# Patient Record
Sex: MALE | Race: WHITE | NOT HISPANIC OR LATINO | URBAN - METROPOLITAN AREA
[De-identification: names, ages, dates, MRNs, and addresses within clinical notes are randomized per-mention and may not be internally consistent; named-entity substitution may affect disease eponyms.]

---

## 2020-03-07 ENCOUNTER — INPATIENT (INPATIENT)
Facility: HOSPITAL | Age: 35
LOS: 1 days | Discharge: HOME | End: 2020-03-09
Attending: INTERNAL MEDICINE | Admitting: INTERNAL MEDICINE
Payer: OTHER GOVERNMENT

## 2020-03-07 VITALS
HEART RATE: 141 BPM | RESPIRATION RATE: 20 BRPM | SYSTOLIC BLOOD PRESSURE: 155 MMHG | DIASTOLIC BLOOD PRESSURE: 75 MMHG | TEMPERATURE: 98 F | OXYGEN SATURATION: 98 %

## 2020-03-07 LAB
ALBUMIN SERPL ELPH-MCNC: 5.2 G/DL — SIGNIFICANT CHANGE UP (ref 3.5–5.2)
ALP SERPL-CCNC: 60 U/L — SIGNIFICANT CHANGE UP (ref 30–115)
ALT FLD-CCNC: 164 U/L — HIGH (ref 0–41)
AMPHET UR-MCNC: NEGATIVE — SIGNIFICANT CHANGE UP
ANION GAP SERPL CALC-SCNC: 36 MMOL/L — HIGH (ref 7–14)
APAP SERPL-MCNC: <5 UG/ML — LOW (ref 10–30)
APPEARANCE UR: CLEAR — SIGNIFICANT CHANGE UP
AST SERPL-CCNC: 157 U/L — HIGH (ref 0–41)
B-OH-BUTYR SERPL-SCNC: 1 MMOL/L — HIGH
BACTERIA # UR AUTO: NEGATIVE — SIGNIFICANT CHANGE UP
BARBITURATES UR SCN-MCNC: POSITIVE
BASE EXCESS BLDV CALC-SCNC: -3.9 MMOL/L — LOW (ref -2–2)
BASOPHILS # BLD AUTO: 0.04 K/UL — SIGNIFICANT CHANGE UP (ref 0–0.2)
BASOPHILS NFR BLD AUTO: 0.4 % — SIGNIFICANT CHANGE UP (ref 0–1)
BENZODIAZ UR-MCNC: NEGATIVE — SIGNIFICANT CHANGE UP
BILIRUB SERPL-MCNC: 0.7 MG/DL — SIGNIFICANT CHANGE UP (ref 0.2–1.2)
BILIRUB UR-MCNC: NEGATIVE — SIGNIFICANT CHANGE UP
BUN SERPL-MCNC: 8 MG/DL — LOW (ref 10–20)
CA-I SERPL-SCNC: 1.05 MMOL/L — LOW (ref 1.12–1.3)
CALCIUM SERPL-MCNC: 9.6 MG/DL — SIGNIFICANT CHANGE UP (ref 8.5–10.1)
CHLORIDE SERPL-SCNC: 88 MMOL/L — LOW (ref 98–110)
CO2 SERPL-SCNC: 17 MMOL/L — SIGNIFICANT CHANGE UP (ref 17–32)
COCAINE METAB.OTHER UR-MCNC: NEGATIVE — SIGNIFICANT CHANGE UP
COLOR SPEC: YELLOW — SIGNIFICANT CHANGE UP
CREAT SERPL-MCNC: 1.1 MG/DL — SIGNIFICANT CHANGE UP (ref 0.7–1.5)
DIFF PNL FLD: NEGATIVE — SIGNIFICANT CHANGE UP
EOSINOPHIL # BLD AUTO: 0.01 K/UL — SIGNIFICANT CHANGE UP (ref 0–0.7)
EOSINOPHIL NFR BLD AUTO: 0.1 % — SIGNIFICANT CHANGE UP (ref 0–8)
EPI CELLS # UR: 0 /HPF — SIGNIFICANT CHANGE UP (ref 0–5)
ETHANOL SERPL-MCNC: <10 MG/DL — SIGNIFICANT CHANGE UP
GAS PNL BLDV: 141 MMOL/L — SIGNIFICANT CHANGE UP (ref 136–145)
GAS PNL BLDV: SIGNIFICANT CHANGE UP
GLUCOSE SERPL-MCNC: 211 MG/DL — HIGH (ref 70–99)
GLUCOSE UR QL: ABNORMAL
HCO3 BLDV-SCNC: 21 MMOL/L — LOW (ref 22–29)
HCT VFR BLD CALC: 40.4 % — LOW (ref 42–52)
HCT VFR BLDA CALC: 44.4 % — HIGH (ref 34–44)
HGB BLD CALC-MCNC: 14.5 G/DL — SIGNIFICANT CHANGE UP (ref 14–18)
HGB BLD-MCNC: 13.8 G/DL — LOW (ref 14–18)
HYALINE CASTS # UR AUTO: 0 /LPF — SIGNIFICANT CHANGE UP (ref 0–7)
IMM GRANULOCYTES NFR BLD AUTO: 0.6 % — HIGH (ref 0.1–0.3)
KETONES UR-MCNC: ABNORMAL
LACTATE BLDV-MCNC: 13.4 MMOL/L — HIGH (ref 0.5–1.6)
LACTATE SERPL-SCNC: 15.4 MMOL/L — CRITICAL HIGH (ref 0.7–2)
LACTATE SERPL-SCNC: 3.5 MMOL/L — HIGH (ref 0.7–2)
LEUKOCYTE ESTERASE UR-ACNC: NEGATIVE — SIGNIFICANT CHANGE UP
LYMPHOCYTES # BLD AUTO: 1.16 K/UL — LOW (ref 1.2–3.4)
LYMPHOCYTES # BLD AUTO: 11.6 % — LOW (ref 20.5–51.1)
MAGNESIUM SERPL-MCNC: 1.1 MG/DL — LOW (ref 1.8–2.4)
MCHC RBC-ENTMCNC: 32.4 PG — HIGH (ref 27–31)
MCHC RBC-ENTMCNC: 34.2 G/DL — SIGNIFICANT CHANGE UP (ref 32–37)
MCV RBC AUTO: 94.8 FL — HIGH (ref 80–94)
METHADONE UR-MCNC: NEGATIVE — SIGNIFICANT CHANGE UP
MONOCYTES # BLD AUTO: 0.47 K/UL — SIGNIFICANT CHANGE UP (ref 0.1–0.6)
MONOCYTES NFR BLD AUTO: 4.7 % — SIGNIFICANT CHANGE UP (ref 1.7–9.3)
NEUTROPHILS # BLD AUTO: 8.27 K/UL — HIGH (ref 1.4–6.5)
NEUTROPHILS NFR BLD AUTO: 82.6 % — HIGH (ref 42.2–75.2)
NITRITE UR-MCNC: NEGATIVE — SIGNIFICANT CHANGE UP
NRBC # BLD: 0 /100 WBCS — SIGNIFICANT CHANGE UP (ref 0–0)
OPIATES UR-MCNC: NEGATIVE — SIGNIFICANT CHANGE UP
OSMOLALITY SERPL: 305 MOSMOL/KG — HIGH (ref 275–300)
PCO2 BLDV: 35 MMHG — LOW (ref 41–51)
PCP SPEC-MCNC: SIGNIFICANT CHANGE UP
PH BLDV: 7.38 — SIGNIFICANT CHANGE UP (ref 7.26–7.43)
PH UR: 6.5 — SIGNIFICANT CHANGE UP (ref 5–8)
PLATELET # BLD AUTO: 119 K/UL — LOW (ref 130–400)
PO2 BLDV: 34 MMHG — SIGNIFICANT CHANGE UP (ref 20–40)
POTASSIUM BLDV-SCNC: 3.1 MMOL/L — LOW (ref 3.3–5.6)
POTASSIUM SERPL-MCNC: 3.3 MMOL/L — LOW (ref 3.5–5)
POTASSIUM SERPL-SCNC: 3.3 MMOL/L — LOW (ref 3.5–5)
PROPOXYPHENE QUALITATIVE URINE RESULT: NEGATIVE — SIGNIFICANT CHANGE UP
PROT SERPL-MCNC: 7.4 G/DL — SIGNIFICANT CHANGE UP (ref 6–8)
PROT UR-MCNC: ABNORMAL
RBC # BLD: 4.26 M/UL — LOW (ref 4.7–6.1)
RBC # FLD: 12 % — SIGNIFICANT CHANGE UP (ref 11.5–14.5)
RBC CASTS # UR COMP ASSIST: 1 /HPF — SIGNIFICANT CHANGE UP (ref 0–4)
SALICYLATES SERPL-MCNC: <0.3 MG/DL — LOW (ref 4–30)
SAO2 % BLDV: 63 % — SIGNIFICANT CHANGE UP
SODIUM SERPL-SCNC: 141 MMOL/L — SIGNIFICANT CHANGE UP (ref 135–146)
SP GR SPEC: 1.03 — HIGH (ref 1.01–1.02)
UROBILINOGEN FLD QL: SIGNIFICANT CHANGE UP
WBC # BLD: 10.01 K/UL — SIGNIFICANT CHANGE UP (ref 4.8–10.8)
WBC # FLD AUTO: 10.01 K/UL — SIGNIFICANT CHANGE UP (ref 4.8–10.8)
WBC UR QL: 1 /HPF — SIGNIFICANT CHANGE UP (ref 0–5)

## 2020-03-07 PROCEDURE — 70450 CT HEAD/BRAIN W/O DYE: CPT | Mod: 26

## 2020-03-07 PROCEDURE — 99285 EMERGENCY DEPT VISIT HI MDM: CPT

## 2020-03-07 PROCEDURE — 93010 ELECTROCARDIOGRAM REPORT: CPT

## 2020-03-07 PROCEDURE — 71045 X-RAY EXAM CHEST 1 VIEW: CPT | Mod: 26

## 2020-03-07 RX ORDER — FOLIC ACID 0.8 MG
1 TABLET ORAL DAILY
Refills: 0 | Status: DISCONTINUED | OUTPATIENT
Start: 2020-03-07 | End: 2020-03-09

## 2020-03-07 RX ORDER — PANTOPRAZOLE SODIUM 20 MG/1
40 TABLET, DELAYED RELEASE ORAL
Refills: 0 | Status: DISCONTINUED | OUTPATIENT
Start: 2020-03-07 | End: 2020-03-09

## 2020-03-07 RX ORDER — ONDANSETRON 8 MG/1
4 TABLET, FILM COATED ORAL EVERY 8 HOURS
Refills: 0 | Status: DISCONTINUED | OUTPATIENT
Start: 2020-03-07 | End: 2020-03-09

## 2020-03-07 RX ORDER — THIAMINE MONONITRATE (VIT B1) 100 MG
100 TABLET ORAL DAILY
Refills: 0 | Status: DISCONTINUED | OUTPATIENT
Start: 2020-03-07 | End: 2020-03-09

## 2020-03-07 RX ORDER — SODIUM CHLORIDE 9 MG/ML
1000 INJECTION, SOLUTION INTRAVENOUS
Refills: 0 | Status: DISCONTINUED | OUTPATIENT
Start: 2020-03-07 | End: 2020-03-08

## 2020-03-07 RX ORDER — SODIUM CHLORIDE 9 MG/ML
2000 INJECTION, SOLUTION INTRAVENOUS ONCE
Refills: 0 | Status: COMPLETED | OUTPATIENT
Start: 2020-03-07 | End: 2020-03-07

## 2020-03-07 RX ORDER — SODIUM CHLORIDE 9 MG/ML
2000 INJECTION INTRAMUSCULAR; INTRAVENOUS; SUBCUTANEOUS ONCE
Refills: 0 | Status: DISCONTINUED | OUTPATIENT
Start: 2020-03-07 | End: 2020-03-07

## 2020-03-07 RX ORDER — MAGNESIUM SULFATE 500 MG/ML
2 VIAL (ML) INJECTION ONCE
Refills: 0 | Status: COMPLETED | OUTPATIENT
Start: 2020-03-07 | End: 2020-03-07

## 2020-03-07 RX ORDER — SODIUM CHLORIDE 9 MG/ML
1000 INJECTION, SOLUTION INTRAVENOUS
Refills: 0 | Status: DISCONTINUED | OUTPATIENT
Start: 2020-03-07 | End: 2020-03-07

## 2020-03-07 RX ORDER — ENOXAPARIN SODIUM 100 MG/ML
40 INJECTION SUBCUTANEOUS DAILY
Refills: 0 | Status: DISCONTINUED | OUTPATIENT
Start: 2020-03-07 | End: 2020-03-09

## 2020-03-07 RX ORDER — THIAMINE MONONITRATE (VIT B1) 100 MG
500 TABLET ORAL ONCE
Refills: 0 | Status: COMPLETED | OUTPATIENT
Start: 2020-03-07 | End: 2020-03-07

## 2020-03-07 RX ADMIN — Medication 2 MILLIGRAM(S): at 15:24

## 2020-03-07 RX ADMIN — SODIUM CHLORIDE 150 MILLILITER(S): 9 INJECTION, SOLUTION INTRAVENOUS at 17:25

## 2020-03-07 RX ADMIN — Medication 105 MILLIGRAM(S): at 17:48

## 2020-03-07 RX ADMIN — Medication 50 MILLIGRAM(S): at 17:47

## 2020-03-07 RX ADMIN — SODIUM CHLORIDE 2000 MILLILITER(S): 9 INJECTION, SOLUTION INTRAVENOUS at 17:23

## 2020-03-07 RX ADMIN — PANTOPRAZOLE SODIUM 40 MILLIGRAM(S): 20 TABLET, DELAYED RELEASE ORAL at 22:01

## 2020-03-07 RX ADMIN — Medication 50 GRAM(S): at 22:01

## 2020-03-07 NOTE — CONSULT NOTE ADULT - ATTENDING COMMENTS
--Will continue to follow. Please call with any further questions    Desean    [phone] (pager)

## 2020-03-07 NOTE — H&P ADULT - HISTORY OF PRESENT ILLNESS
34 years old male pt with past medical hx of PUD came to ER because of 1 day h/o of vomiting , AMS and tremors following binge alcohol drinking.  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  yesterday morning from 10 am tilL 12 am he has herber drinking 1/5 pint of vodka, following that he starting having vomiting 6-7 episodes, associated with nausea and body tremors, today morning he noticed he is acting weird  he has not been eating well for few days.  yesterday night he drank some energy drinks too.  he denies any fever, abdominal pain, any diarrhoea.  He has endoscopy febraury 6th which showed PUD s/p clipping, he has been taking twice daily PPI and tums, but denies using excessive amount of tums.  he denies any headaches visual symptoms denies any seizure like activities, no LOC.  He was compative with EMS , while in ER was found to be tachycardic, his initial lactic acid was 15 which imrved o 3.5 s/p IV hydration

## 2020-03-07 NOTE — H&P ADULT - ASSESSMENT
34 years old male pt with past medical hx of PUD came to ER because of 1 day h/o of vomiting , AMS and tremors following binge alcohol drinking.    # tremors and vomiting     increased lactic acid with elevated ketone and beta hydroxybutyrate     elevated anion gap     likely alcoholic ketoacidosis     will continue with 5 % dextrose with NS for now     monitor magnesium and potassium     continue thiamine and folic acid     monitor lactic acid     will keep NPO for now     IV lorazepam PRN for alcohol withdrawal        # transaminitis     both ALT and ASt elevated     ? alcohol induced though AST > ALT     will check hepatitis profile     US abdomen     lipid profile for [facility]    # PUD     will continue PPI bid     monitor mag ( can low because of PPI induced / refeeding )    # DVT prophylaxis     enoxaparin 40 mg daily    # activity ambulate as tolerated

## 2020-03-07 NOTE — ED PROVIDER NOTE - PROGRESS NOTE DETAILS
Dr. Anton assessed patient. Believes this is mixed picture of AKA and metabolic alkalosis from PO intake of alkaloids. Recommends magnesium, thiamine, D5NS fluids. CIWA 8 on arrival. Given 2 of ativan and 50 librium.

## 2020-03-07 NOTE — ED PROVIDER NOTE - CARE PLAN
Principal Discharge DX:	Alcoholic ketoacidosis  Secondary Diagnosis:	Metabolic alkalosis  Secondary Diagnosis:	Alcohol withdrawal

## 2020-03-07 NOTE — CONSULT NOTE ADULT - SUBJECTIVE AND OBJECTIVE BOX
Time:20 @ 23:41  [facility] ER Hold 004 A  Emergent/Routine    Chief Complaint:  vomiting and tremulous    History of Present Illness:  34y man  with past medical hx of PUD came to ER because of 3 day h/o of vomiting and tremors following binge alcohol drinking.  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  When he found this out,, yesterday morning from 10 am tilL 12 am he has herber drinking 1/5 pint of vodka, following that he starting having vomiting 6-7 episodes, associated with nausea and body tremors, today morning he noticed he is acting weird.  He states he drank 3 "Monster energy drinks" and tried to sober up to make it to  training.  He has not been able to eat since Wednesday due to his gastritis and stomach ulcers.  he denies any fever, abdominal pain, any diarrhoea.  He has endoscopy  which showed PUD s/p clipping, he has been taking twice daily PPI and multiple antacids.  he denies any headaches visual symptoms denies any seizure like activities, no LOC.  He was compative with EMS , while in ER was found to be tachycardic, his initial lactic acid was 15 which improved o 3.5 s/p IV hydration and high dose IV thiamine.        Past Medical History:  ALCOHOLIC KETOACIDOSIS  ^ALCOHOLIC KETOACIDOSIS  MEWS Score  Alcoholic ketoacidosis  ULCER  Alcohol withdrawal  Metabolic alkalosis        Home Medications:  Patient believes Prevacid as well as multiple oral antacids such as Tums      Active Medications:  MEDICATIONS  (STANDING):  dextrose 5% + sodium chloride 0.9%. 1000 milliLiter(s) (150 mL/Hr) IV Continuous <Continuous>  enoxaparin Injectable 40 milliGRAM(s) SubCutaneous daily  folic acid 1 milliGRAM(s) Oral daily  pantoprazole    Tablet 40 milliGRAM(s) Oral two times a day  thiamine 100 milliGRAM(s) Oral daily    MEDICATIONS  (PRN):  LORazepam     Tablet 1 milliGRAM(s) Oral every 4 hours PRN Agitation  ondansetron Injectable 4 milliGRAM(s) IV Push every 8 hours PRN Nausea and/or Vomiting        Social History:  Tobacco:   (+) tobacco and vaping of nicotine  EtOH:   (+) binge drinker  Illicit Substances:   Denies    Family History:  Denies    Review of Systems:  GENERAL: see HPI  SKIN: Zaira rashes, abrasions, lacerations, ecchymosis, erythema, or edema.  HEAD: Denies headache, dizziness or trauma  EYES: Denies blurry vision, diplopia, or photophobia  ENT: Denies earaches, discharge or hearing loss. Denies nasal discharge or epistaxis. Denies sore throat.   CARDIAC: Denies chest pain, palpitations, or SOB.   RESPIRATORY: Denies SOB, cough, hemoptysis or wheezing.   GI: Denies abdominal pain,(+)  n/v but no diarrhea, bloody stools or melena.   :Denies hematuria, dysuria or frequency.   MSK: Denies myalgias, bony deformity or pain.   NEURO: Denies numbness, tingling, weakness.  All other systems negative or non-contributory      Physical Exam:  Vital Signs Last 24 Hrs  T(C): 36.6 (07 Mar 2020 23:13), Max: 36.6 (07 Mar 2020 14:54)  T(F): 97.8 (07 Mar 2020 23:13), Max: 97.8 (07 Mar 2020 14:54)  HR: 99 (07 Mar 2020 23:13) (99 - 141)  BP: 145/77 (07 Mar 2020 23:13) (145/77 - 155/75)  BP(mean): --  RR: 18 (07 Mar 2020 23:13) (18 - 20)  SpO2: 99% (07 Mar 2020 23:13) (98% - 99%)  CAPILLARY BLOOD GLUCOSE      POCT Blood Glucose.: 195 mg/dL (07 Mar 2020 15:35)  POCT Blood Glucose.: 184 mg/dL (07 Mar 2020 14:55)      VITAL SIGNS: I have reviewed nursing notes and confirm.  CONSTITUTIONAL: Well-developed; well-nourished; in moderate acute distress.  SKIN: Skin exam is warm and dry, no acute rash.  HEAD: Normocephalic; atraumatic.  EYES: PERRL, EOM intact but horizontal nystagmus present; conjunctiva and sclera clear.  ENT: No nasal discharge; airway clear. TMs clear.  NECK: Supple; non tender.  CARD: S1, S2 normal; no murmurs, gallops, or rubs. Regular rate and rhythm.  RESP: No wheezes, rales or rhonchi.  ABD: Normal bowel sounds; soft; non-distended; non-tender; no hepatosplenomegaly.  EXT: (+) bilateral hyperreflexia without clonus. No clubbing, cyanosis or edema.  LYMPH: No acute cervical adenopathy.  NEURO: Alert, oriented. Grossly unremarkable. No focal deficits. (+) diffuse tremor and tongue fasciculations  PSYCH: Cooperative, appropriate.      GCS:  E:   4/4  V:   5/5  M:   6/6    EKG:  Sinus tach @ 126 bpm;  QRs-96 ms;  QTc-443 ms  Labs:                        13.8   10.01 )-----------( 119      ( 07 Mar 2020 15:15 )             40.4     03-07    141  |  88<L>  |  8<L>  ----------------------------<  211<H>  3.3<L>   |  17  |  1.1    Ca    9.6      07 Mar 2020 15:15  Mg     1.1     03-07    TPro  7.4  /  Alb  5.2  /  TBili  0.7  /  DBili  x   /  AST  157<H>  /  ALT  164<H>  /  AlkPhos  60  03-07      Urinalysis Basic - ( 07 Mar 2020 16:44 )    Color: Yellow / Appearance: Clear / S.030 / pH: x  Gluc: x / Ketone: Large  / Bili: Negative / Urobili: <2 mg/dL   Blood: x / Protein: 30 mg/dL / Nitrite: Negative   Leuk Esterase: Negative / RBC: 1 /HPF / WBC 1 /HPF   Sq Epi: x / Non Sq Epi: 0 /HPF / Bacteria: Negative            Aspirin:  not detected  Acetaminophen:  Not detected  Ethanol:  Not detected

## 2020-03-07 NOTE — ED PROVIDER NOTE - CLINICAL SUMMARY MEDICAL DECISION MAKING FREE TEXT BOX
34M 34M p/w tremors, acute psychosis. On my eval, pt noted to be extremely anxious, blurting statements. Pt was anox3- would answer all questions when asked. Reports no drug use- but states he had alcohol a day ago. On physical pt has some left sided nystagmus, tachycardic, mild diaphoresis, ctab, cap refill < 3 sec, from x4. Pt was calmed using verbal deescalation and verbal techniques. Concern of intermittent psychosis - ordered cth, labs. review of labs noted to have elevated lactate- AG @ 30- No Osmolar gap. Tox consulted- will given thiamine, likely malnutrition component with AKA. nl PH- which can be a mixed effect from pt ingesting tums/ alkaloids. CTH neg for ich, midline shift, or hydrocephalus. 34M p/w tremors, acute psychosis. On my eval, pt noted to be extremely anxious, blurting statements. Pt was anox3- would answer all questions when asked. Reports no drug use- but states he had alcohol a day ago. On physical pt has some left sided nystagmus, tachycardic, mild diaphoresis, ctab, cap refill < 3 sec, from x4. Pt was calmed using verbal deescalation and verbal techniques. Concern of intermittent psychosis - ordered cth, labs. review of labs noted to have elevated lactate- AG @ 30- No Osmolar gap. Tox consulted- will given thiamine, likely malnutrition component with AKA. nl PH- which can be a mixed effect from pt ingesting tums/ antacids. CTH neg for ich, midline shift, or hydrocephalus.

## 2020-03-07 NOTE — H&P ADULT - ATTENDING COMMENTS
33 yo M pt w/ a hx of alcohol abuse p/w nausea, vomiting (6 episodes w/ non-billious non-bloody vomitus), tremors and anxiety. Last drink was 2 days ago (on the day before presentation when these symptoms began). At the time, the patient had consumed 1/5th of a pint of vodka which, he speculates, is what precipitated his symptoms. Reports that he has not been eating for a few days (had a few energy drinks yesterday). Attributes his drinking to social concerns, has tried stopping a few times and also endorses a history of withdrawal symptoms when he stops drinking.   At the time of this evaluation, the patient reports having some heartburn. Reports a history of PUD for which he underwent an EGD on 02/06/2020 (s/p ulcer clipping). Denies nausea or vomiting at this time, is asking for food and states that his tremors have improved. Denies sweats or significant anxiety at this time. Endorses a slight headache but denies any tactile, auditory or visual disturbances.    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/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)  Osmolal gap wnL – pointing away from methanol / ethylene glycol poisoning  Alcohol abuse (w/ need for counselling)  Potential for alcohol withdrawal: (CIWA = 6 for mild nausea (1), tremor (2), anxiety (1) and headache (2))  Peptic ulcer disease (s/p recent clipping of ulcer)  Transaminitis - alcoholic liver disease, steatosis/steatohepatitis  Hypokalemia and hypomagnesemia: electrolyte imbalances 2/2 poor nutrition and vomiting     Plan:  Monitor CIWA score: 8 on presentation and 6 on my exam  Benzo’s as appropriate to prevent withdrawal  Feed the patient: diet as tolerated  C/w IVF hydration  Monitor electrolytes and replete aggressively  Thiamine, folic acid and multi-vitamin  PPI for gastritis/PUD  Likely alcohol associated liver disease but can check HBsAg, HBsAb, HBcAb and anti-HCV, serum Fe, TIBC for further clarification    Patient counseled at length about the dangers of continuing his heavy alcohol use which include, but are not limited to, Ladonna Alcala tears, gastritis, worsening of PUD, pancreatitis, hepatitis w/ progression to cirrhosis, cardiomyopathy etc. Encouraged patient to seek help in regards to his alcohol use. Discussed 12 step program with the patient. Can refer patient at the time of d/c    Code status: full code

## 2020-03-07 NOTE — ED ADULT TRIAGE NOTE - CHIEF COMPLAINT QUOTE
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 As per EMS, officer at the camp stated pt stopped drinking and smoking ciagrettes x 5 days ago,  Pt states he drank yesterday, shaking in triage and acting paranoid.

## 2020-03-07 NOTE — CONSULT NOTE ADULT - ASSESSMENT
Patient with complex acid base derangement likely due to multiple issues    1-  AKA  - Check Beta hydroxybutyrate  -  IV hydration and administration of dextrose  -  Feed the patient once his nausea improves    2-  thiamine deficiency  -  Thiamine 500 mg IV x1 then 100 mg IV q 6-8 hours until lactate cleared    3-  Milk alkali syndrome  -  Excessive use of antacids such as calcium hydroxide in setting of gastritis    4-  Hypochloremic metabolic alkalosis  -  Aggressively hydrate with NaCL  -  replace K  -  Replace Mg    5-  Starvation ketosis  -  Dextrose  -  Feed the patient    Patient also has a component of alcohol withdrawal.  Needs CIWA and consider Addiction medicine eval to determine best management moving forward.  Responded very nicely to low doses of benzodiazepines.  Stable for medical floor at this time.    Positive UDS for barbiturates is interesting.  History did not reveal a clear etiology for this.  Hopefully confirmatory testing will help lead to the reason for this.

## 2020-03-07 NOTE — H&P ADULT - NSHPSOCIALHISTORY_GEN_ALL_CORE
quit smoking a week ago, 1/5 to 1 pack a day  alcoholic usually drinks beer and vodka  denies any illict drung

## 2020-03-07 NOTE — ED PROVIDER NOTE - NS ED ROS FT
Constitutional: No fevers. +etoh abuse.   Eyes:  No visual changes, eye pain or discharge.  ENMT:  No sore throat or runny nose.  Cardiac:  No chest pain, SOB or edema.   Respiratory:  No cough or respiratory distress. No hemoptysis.  GI:  No nausea, vomiting, diarrhea or abdominal pain.  :  No dysuria, frequency or burning.  MS:  No myalgia, muscle weakness, joint pain or back pain.  Neuro:  +agitated. +paranoid. +tremors.   Skin:  No skin rash.   Endocrine: No history of thyroid disease or diabetes.  Psych: No psych hx. +paranoid.

## 2020-03-07 NOTE — H&P ADULT - NSHPLABSRESULTS_GEN_ALL_CORE
03-07    141  |  88<L>  |  8<L>  ----------------------------<  211<H>  3.3<L>   |  17  |  1.1    Ca    9.6      07 Mar 2020 15:15  Mg     1.1     03-07    TPro  7.4  /  Alb  5.2  /  TBili  0.7  /  DBili  x   /  AST  157<H>  /  ALT  164<H>  /  AlkPhos  60  03-07                          13.8   10.01 )-----------( 119      ( 07 Mar 2020 15:15 )             40.4     < from: CT Head No Cont (03.07.20 @ 16:51) >      IMPRESSION:    No CT evidence of acute intracranial pathology.    < end of copied text >      Lactic Acid Trend  03-07-20 @ 18:15   -   3.5<H>  03-07-20 @ 16:10   -   15.4<HH>

## 2020-03-07 NOTE — ED ADULT NURSE NOTE - OBJECTIVE STATEMENT
Pt presents to ED shaking and paranoid. Pt states he ingested vodka yesterday and then 3 monster energy drinks. Pt CIWA scored a 9 on arrival

## 2020-03-07 NOTE — H&P ADULT - NSHPPHYSICALEXAM_GEN_ALL_CORE
Vital Signs Last 24 Hrs  T(C): 36.6 (07 Mar 2020 14:54), Max: 36.6 (07 Mar 2020 14:54)  T(F): 97.8 (07 Mar 2020 14:54), Max: 97.8 (07 Mar 2020 14:54)  HR: 141 (07 Mar 2020 14:54) (141 - 141)  BP: 155/75 (07 Mar 2020 14:54) (155/75 - 155/75)  RR: 20 (07 Mar 2020 14:54) (20 - 20)  SpO2: 98% (07 Mar 2020 14:54) (98% - 98%)    PHYSICAL EXAM:  GENERAL: NAD, speaks in full sentences, no signs of respiratory distress  fine trmoes on extended hands  HEAD:  Atraumatic, Normocephalic  EYES: EOMI, PERRLA, conjunctiva and sclera clear  NECK: Supple, No JVD  CHEST/LUNG: Clear to auscultation bilaterally; No wheeze; No crackles; No accessory muscles used  HEART: Regular rate and rhythm; No murmurs;   ABDOMEN: Soft, Nontender, Nondistended; Bowel sounds present; No guarding  EXTREMITIES:  2+ Peripheral Pulses, No cyanosis or edema  PSYCH: AAOx3

## 2020-03-07 NOTE — ED PROVIDER NOTE - OBJECTIVE STATEMENT
Patient is a 33 yo M w/ hx of ETOH abuse, Patient is a 33 yo M w/ hx of ETOH abuse, PUD p/w AMS. Patient was at [facility] training [facility] when he developed tremors and Patient is a 35 yo M w/ hx of ETOH abuse, PUD p/w AMS. Patient was at [employer] [facility] when he developed tremors and anxiety. Patient states he has a hx of withdrawal symptoms from alcohol; last drink was 2 days ago per patient. Also states he has not eaten in 3 days. States he has been taking tums frequently for GERD. Patient denies drug use, denies home made alcohol use; states he typically drinks 1/5 pint of vodka. Patient was agitated and paranoid with EMS. Denies psychiatric hx. States he had 3 cans of monster last night.

## 2020-03-07 NOTE — ED ADULT NURSE NOTE - CHIEF COMPLAINT QUOTE
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 As per EMS, officer at the camp stated pt stopped drinking and smoking ciagrettes x 5 days ago,  Pt states he drank yesterday, shaking in triage and acting paranoid.

## 2020-03-07 NOTE — ED PROVIDER NOTE - PHYSICAL EXAMINATION
CONSTITUTIONAL: Well-developed; well-nourished; appears anxious.   SKIN: warm, dry. Moist underarms.   HEAD: Normocephalic; atraumatic.  EYES: PERRL, EOMI, no conjunctival erythema. Lateral nystagmus.   ENT: No nasal discharge; airway clear.  NECK: Supple; non tender.  CARD: S1, S2 normal; no murmurs, gallops, or rubs. tachycardic.   RESP: No wheezes, rales or rhonchi.  ABD: soft ntnd. no bladder distention.   EXT: Normal ROM.  No clubbing, cyanosis or edema.   NEURO: Alert, oriented, grossly unremarkable. Tremors to all four extremities. Tongue fasciculations.   PSYCH: Cooperative, appropriate.

## 2020-03-08 LAB
ALBUMIN SERPL ELPH-MCNC: 4.3 G/DL — SIGNIFICANT CHANGE UP (ref 3.5–5.2)
ALBUMIN SERPL ELPH-MCNC: 4.5 G/DL — SIGNIFICANT CHANGE UP (ref 3.5–5.2)
ALP SERPL-CCNC: 50 U/L — SIGNIFICANT CHANGE UP (ref 30–115)
ALP SERPL-CCNC: 52 U/L — SIGNIFICANT CHANGE UP (ref 30–115)
ALT FLD-CCNC: 110 U/L — HIGH (ref 0–41)
ALT FLD-CCNC: 120 U/L — HIGH (ref 0–41)
ANION GAP SERPL CALC-SCNC: 13 MMOL/L — SIGNIFICANT CHANGE UP (ref 7–14)
ANION GAP SERPL CALC-SCNC: 16 MMOL/L — HIGH (ref 7–14)
AST SERPL-CCNC: 103 U/L — HIGH (ref 0–41)
AST SERPL-CCNC: 92 U/L — HIGH (ref 0–41)
BILIRUB SERPL-MCNC: 0.6 MG/DL — SIGNIFICANT CHANGE UP (ref 0.2–1.2)
BILIRUB SERPL-MCNC: 0.7 MG/DL — SIGNIFICANT CHANGE UP (ref 0.2–1.2)
BUN SERPL-MCNC: 6 MG/DL — LOW (ref 10–20)
BUN SERPL-MCNC: 6 MG/DL — LOW (ref 10–20)
CALCIUM SERPL-MCNC: 9 MG/DL — SIGNIFICANT CHANGE UP (ref 8.5–10.1)
CALCIUM SERPL-MCNC: 9.2 MG/DL — SIGNIFICANT CHANGE UP (ref 8.5–10.1)
CHLORIDE SERPL-SCNC: 100 MMOL/L — SIGNIFICANT CHANGE UP (ref 98–110)
CHLORIDE SERPL-SCNC: 98 MMOL/L — SIGNIFICANT CHANGE UP (ref 98–110)
CHOLEST SERPL-MCNC: 199 MG/DL — SIGNIFICANT CHANGE UP (ref 100–200)
CO2 SERPL-SCNC: 26 MMOL/L — SIGNIFICANT CHANGE UP (ref 17–32)
CO2 SERPL-SCNC: 29 MMOL/L — SIGNIFICANT CHANGE UP (ref 17–32)
CREAT SERPL-MCNC: 0.8 MG/DL — SIGNIFICANT CHANGE UP (ref 0.7–1.5)
CREAT SERPL-MCNC: 0.9 MG/DL — SIGNIFICANT CHANGE UP (ref 0.7–1.5)
GLUCOSE SERPL-MCNC: 101 MG/DL — HIGH (ref 70–99)
GLUCOSE SERPL-MCNC: 115 MG/DL — HIGH (ref 70–99)
HCT VFR BLD CALC: 39.2 % — LOW (ref 42–52)
HDLC SERPL-MCNC: 91 MG/DL — SIGNIFICANT CHANGE UP
HGB BLD-MCNC: 13.2 G/DL — LOW (ref 14–18)
LACTATE SERPL-SCNC: 1.4 MMOL/L — SIGNIFICANT CHANGE UP (ref 0.7–2)
LACTATE SERPL-SCNC: 1.8 MMOL/L — SIGNIFICANT CHANGE UP (ref 0.7–2)
LIDOCAIN IGE QN: 61 U/L — HIGH (ref 7–60)
LIPID PNL WITH DIRECT LDL SERPL: 92 MG/DL — SIGNIFICANT CHANGE UP (ref 4–129)
MAGNESIUM SERPL-MCNC: 2 MG/DL — SIGNIFICANT CHANGE UP (ref 1.8–2.4)
MAGNESIUM SERPL-MCNC: 2.1 MG/DL — SIGNIFICANT CHANGE UP (ref 1.8–2.4)
MCHC RBC-ENTMCNC: 32 PG — HIGH (ref 27–31)
MCHC RBC-ENTMCNC: 33.7 G/DL — SIGNIFICANT CHANGE UP (ref 32–37)
MCV RBC AUTO: 94.9 FL — HIGH (ref 80–94)
NRBC # BLD: 0 /100 WBCS — SIGNIFICANT CHANGE UP (ref 0–0)
PHOSPHATE SERPL-MCNC: 2.2 MG/DL — SIGNIFICANT CHANGE UP (ref 2.1–4.9)
PHOSPHATE SERPL-MCNC: 2.7 MG/DL — SIGNIFICANT CHANGE UP (ref 2.1–4.9)
PLATELET # BLD AUTO: 81 K/UL — LOW (ref 130–400)
POTASSIUM SERPL-MCNC: 3.8 MMOL/L — SIGNIFICANT CHANGE UP (ref 3.5–5)
POTASSIUM SERPL-MCNC: 4.1 MMOL/L — SIGNIFICANT CHANGE UP (ref 3.5–5)
POTASSIUM SERPL-SCNC: 3.8 MMOL/L — SIGNIFICANT CHANGE UP (ref 3.5–5)
POTASSIUM SERPL-SCNC: 4.1 MMOL/L — SIGNIFICANT CHANGE UP (ref 3.5–5)
PROT SERPL-MCNC: 6.3 G/DL — SIGNIFICANT CHANGE UP (ref 6–8)
PROT SERPL-MCNC: 6.4 G/DL — SIGNIFICANT CHANGE UP (ref 6–8)
RBC # BLD: 4.13 M/UL — LOW (ref 4.7–6.1)
RBC # FLD: 12.2 % — SIGNIFICANT CHANGE UP (ref 11.5–14.5)
SODIUM SERPL-SCNC: 140 MMOL/L — SIGNIFICANT CHANGE UP (ref 135–146)
SODIUM SERPL-SCNC: 142 MMOL/L — SIGNIFICANT CHANGE UP (ref 135–146)
TOTAL CHOLESTEROL/HDL RATIO MEASUREMENT: 2.2 RATIO — LOW (ref 4–5.5)
TRIGL SERPL-MCNC: 74 MG/DL — SIGNIFICANT CHANGE UP (ref 10–149)
WBC # BLD: 4.73 K/UL — LOW (ref 4.8–10.8)
WBC # FLD AUTO: 4.73 K/UL — LOW (ref 4.8–10.8)

## 2020-03-08 PROCEDURE — 99285 EMERGENCY DEPT VISIT HI MDM: CPT

## 2020-03-08 PROCEDURE — 99223 1ST HOSP IP/OBS HIGH 75: CPT

## 2020-03-08 PROCEDURE — 76705 ECHO EXAM OF ABDOMEN: CPT | Mod: 26

## 2020-03-08 RX ORDER — POTASSIUM CHLORIDE 20 MEQ
40 PACKET (EA) ORAL ONCE
Refills: 0 | Status: COMPLETED | OUTPATIENT
Start: 2020-03-08 | End: 2020-03-08

## 2020-03-08 RX ADMIN — Medication 1 TABLET(S): at 11:30

## 2020-03-08 RX ADMIN — Medication 100 MILLIGRAM(S): at 11:30

## 2020-03-08 RX ADMIN — Medication 40 MILLIEQUIVALENT(S): at 03:41

## 2020-03-08 RX ADMIN — PANTOPRAZOLE SODIUM 40 MILLIGRAM(S): 20 TABLET, DELAYED RELEASE ORAL at 17:33

## 2020-03-08 RX ADMIN — SODIUM CHLORIDE 150 MILLILITER(S): 9 INJECTION, SOLUTION INTRAVENOUS at 13:10

## 2020-03-08 RX ADMIN — SODIUM CHLORIDE 150 MILLILITER(S): 9 INJECTION, SOLUTION INTRAVENOUS at 05:13

## 2020-03-08 RX ADMIN — PANTOPRAZOLE SODIUM 40 MILLIGRAM(S): 20 TABLET, DELAYED RELEASE ORAL at 05:13

## 2020-03-08 RX ADMIN — ENOXAPARIN SODIUM 40 MILLIGRAM(S): 100 INJECTION SUBCUTANEOUS at 11:30

## 2020-03-08 RX ADMIN — Medication 1 MILLIGRAM(S): at 11:30

## 2020-03-08 NOTE — PROGRESS NOTE ADULT - ASSESSMENT
34 years old male pt with past medical hx of PUD came to ER because of 1 day h/o of vomiting , AMS and tremors following binge alcohol drinking.    # tremors and vomiting In setting of Alcohol abuse  - Labs with increased lactic acid with elevated ketone and beta hydroxybutyrate, elevated AGap 2/2 Alcoholic ketoacidosis.   - S/p IV fluids with improvement in labs- Lactate back to normal, Gap closed.   - Monitor electrolytes and replaced prn  - c/w  thiamine and folic acid  - Diet regular as tolerated  - CIWA monitoring and prn ativan  - Seen by toxicology  - Addiction medicine consult open         # transaminitis ( 2/2 Alcoholic liver disease)  - ALT, AST both elevated  - f/u hepatitis profile  - US abdomen with echogenic parenchyma 2.2 fatty vs hepatocellular disease  - Lipid profile noted    # PUD on recent endoscopy   continue PPI bid   monitor mag ( can low because of PPI induced / refeeding )    # DVT prophylaxis     enoxaparin 40 mg daily    # activity ambulate as tolerated  #diet- regular  # Ambulate as tolerated  # dispo- from home, monitor CIWA and addiction medicine eval.

## 2020-03-08 NOTE — PROGRESS NOTE ADULT - SUBJECTIVE AND OBJECTIVE BOX
SUBJECTIVE:    Patient is a 34y old Male who presents with a chief complaint of vomiting, tremor and AMS (07 Mar 2020 23:40)    Currently admitted to medicine with the primary diagnosis of Alcoholic ketoacidosis     Today is hospital day 1d. This morning he is resting comfortably in bed and reports no new issues or overnight events.  Pt seen at bedside, reports feeling fine. On Iv fluids and tolerating diet.      PAST MEDICAL & SURGICAL HISTORY    SOCIAL HISTORY:  Negative for smoking/alcohol/drug use.     ALLERGIES:  No Known Allergies    MEDICATIONS:  STANDING MEDICATIONS  dextrose 5% + sodium chloride 0.9%. 1000 milliLiter(s) IV Continuous <Continuous>  enoxaparin Injectable 40 milliGRAM(s) SubCutaneous daily  folic acid 1 milliGRAM(s) Oral daily  multivitamin 1 Tablet(s) Oral daily  pantoprazole    Tablet 40 milliGRAM(s) Oral two times a day  thiamine 100 milliGRAM(s) Oral daily    PRN MEDICATIONS  LORazepam     Tablet 1 milliGRAM(s) Oral every 4 hours PRN  ondansetron Injectable 4 milliGRAM(s) IV Push every 8 hours PRN    VITALS:   T(F): 97.6  HR: 73  BP: 133/83  RR: 18  SpO2: 99%    LABS:                        13.2   4.73  )-----------( 81       ( 08 Mar 2020 05:25 )             39.2     03-08    142  |  100  |  6<L>  ----------------------------<  115<H>  4.1   |  29  |  0.9    Ca    9.2      08 Mar 2020 05:25  Phos  2.7     03-08  Mg     2.1     03-08    TPro  6.3  /  Alb  4.3  /  TBili  0.7  /  DBili  x   /  AST  92<H>  /  ALT  110<H>  /  AlkPhos  50  03-08      Urinalysis Basic - ( 07 Mar 2020 16:44 )    Color: Yellow / Appearance: Clear / S.030 / pH: x  Gluc: x / Ketone: Large  / Bili: Negative / Urobili: <2 mg/dL   Blood: x / Protein: 30 mg/dL / Nitrite: Negative   Leuk Esterase: Negative / RBC: 1 /HPF / WBC 1 /HPF   Sq Epi: x / Non Sq Epi: 0 /HPF / Bacteria: Negative        Lactate, Blood: 1.4 mmol/L (20 @ 05:25)  Lactate, Blood: 1.8 mmol/L (20 @ 00:04)  Lactate, Blood: 3.5 mmol/L <H> (20 @ 18:15)  Lactate, Blood: 15.4 mmol/L <HH> (20 @ 16:10)          RADIOLOGY:  < from: US Abdomen Limited (20 @ 00:13) >  IMPRESSION:    Echogenic hepatic parenchyma which may be secondary to fatty infiltration or hepatocellular disease.    < end of copied text >    PHYSICAL EXAM:  GEN: No acute distress  LUNGS: Clear to auscultation bilaterally   HEART: S1/S2 present. RRR.   ABD: Soft, non-tender, non-distended. Bowel sounds present  EXT: NC/NC/NE/2+PP/[place]  NEURO: AAOX3

## 2020-03-08 NOTE — DISCHARGE NOTE PROVIDER - NSDCMRMEDTOKEN_GEN_ALL_CORE_FT
folic acid 1 mg oral tablet: 1 tab(s) orally once a day  pantoprazole 40 mg oral delayed release tablet: 1 tab(s) orally 2 times a day  thiamine 100 mg oral tablet: 1 tab(s) orally once a day

## 2020-03-08 NOTE — DISCHARGE NOTE PROVIDER - NSDCCPCAREPLAN_GEN_ALL_CORE_FT
PRINCIPAL DISCHARGE DIAGNOSIS  Diagnosis: Alcoholic ketoacidosis  Assessment and Plan of Treatment: you had lab abnormalities from alcohol use. Your labs imporved post IV fluids. Avoid alcohol use and take protonix. PRINCIPAL DISCHARGE DIAGNOSIS  Diagnosis: Alcoholic ketoacidosis  Assessment and Plan of Treatment: you had lab abnormalities from alcohol use. Your labs imporved post IV fluids. Avoid alcohol use and take protonix, folic acid and thiamine as prescribed.

## 2020-03-08 NOTE — DISCHARGE NOTE PROVIDER - PROVIDER TOKENS
FREE:[LAST:[Primary Care doctor],PHONE:[(   )    -],FAX:[(   )    -],ADDRESS:[Known to the patient]]

## 2020-03-08 NOTE — DISCHARGE NOTE PROVIDER - HOSPITAL COURSE
34 years old male pt with past medical hx of PUD came to ER because of 1 day h/o of vomiting , AMS and tremors following binge alcohol drinking.    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    yesterday morning from 10 am tilL 12 am he has herber drinking 1/5 pint of vodka, following that he starting having vomiting 6-7 episodes, associated with nausea and body tremors, today morning he noticed he is acting weird    he has not been eating well for few days.    yesterday night he drank some energy drinks too.    he denies any fever, abdominal pain, any diarrhoea.    He has endoscopy febraury 6th which showed PUD s/p clipping, he has been taking twice daily PPI and tums, but denies using excessive amount of tums.    he denies any headaches visual symptoms denies any seizure like activities, no LOC.    He was compative with EMS , while in ER was found to be tachycardic, his initial lactic acid was 15 which imrved o 3.5 s/p IV hydration    Pt improved post IV fluids and was able to tolerate po diet. 34 years old male pt with past medical hx of PUD came to ER because of 1 day h/o of vomiting , AMS and tremors following binge alcohol drinking.    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    yesterday morning from 10 am tilL 12 am he has herber drinking 1/5 pint of vodka, following that he starting having vomiting 6-7 episodes, associated with nausea and body tremors, today morning he noticed he is acting weird    he has not been eating well for few days.    yesterday night he drank some energy drinks too.    he denies any fever, abdominal pain, any diarrhoea.    He has endoscopy febraury 6th which showed PUD s/p clipping, he has been taking twice daily PPI and tums, but denies using excessive amount of tums.    he denies any headaches visual symptoms denies any seizure like activities, no LOC.    He was compative with EMS , while in ER was found to be tachycardic, his initial lactic acid was 15 which imrved o 3.5 s/p IV hydration    Pt improved post IV fluids and was able to tolerate po diet. Pt will be discharged home.

## 2020-03-09 VITALS
OXYGEN SATURATION: 99 % | HEART RATE: 66 BPM | SYSTOLIC BLOOD PRESSURE: 128 MMHG | RESPIRATION RATE: 18 BRPM | TEMPERATURE: 97 F | DIASTOLIC BLOOD PRESSURE: 70 MMHG

## 2020-03-09 LAB
ALBUMIN SERPL ELPH-MCNC: 4.3 G/DL — SIGNIFICANT CHANGE UP (ref 3.5–5.2)
ALP SERPL-CCNC: 49 U/L — SIGNIFICANT CHANGE UP (ref 30–115)
ALT FLD-CCNC: 98 U/L — HIGH (ref 0–41)
AMOBARBITAL UR QL SCN: NEGATIVE NG/ML — SIGNIFICANT CHANGE UP
AMOBARBITAL, UR RESULT: NEGATIVE NG/ML — SIGNIFICANT CHANGE UP
ANION GAP SERPL CALC-SCNC: 13 MMOL/L — SIGNIFICANT CHANGE UP (ref 7–14)
AST SERPL-CCNC: 87 U/L — HIGH (ref 0–41)
BARBITURATES IN-HOUSE INTERPRETATION: POSITIVE
BARBITURATES UR QL SCN: POSITIVE
BASOPHILS # BLD AUTO: 0.02 K/UL — SIGNIFICANT CHANGE UP (ref 0–0.2)
BASOPHILS NFR BLD AUTO: 0.6 % — SIGNIFICANT CHANGE UP (ref 0–1)
BILIRUB SERPL-MCNC: 0.6 MG/DL — SIGNIFICANT CHANGE UP (ref 0.2–1.2)
BUN SERPL-MCNC: 8 MG/DL — LOW (ref 10–20)
BUTALBITAL UR QL SCN: NEGATIVE NG/ML — SIGNIFICANT CHANGE UP
BUTALBITAL, UR RESULT: NEGATIVE NG/ML — SIGNIFICANT CHANGE UP
CALCIUM SERPL-MCNC: 9.3 MG/DL — SIGNIFICANT CHANGE UP (ref 8.5–10.1)
CHLORIDE SERPL-SCNC: 104 MMOL/L — SIGNIFICANT CHANGE UP (ref 98–110)
CO2 SERPL-SCNC: 24 MMOL/L — SIGNIFICANT CHANGE UP (ref 17–32)
CREAT SERPL-MCNC: 0.9 MG/DL — SIGNIFICANT CHANGE UP (ref 0.7–1.5)
EOSINOPHIL # BLD AUTO: 0.25 K/UL — SIGNIFICANT CHANGE UP (ref 0–0.7)
EOSINOPHIL NFR BLD AUTO: 6.9 % — SIGNIFICANT CHANGE UP (ref 0–8)
GLUCOSE SERPL-MCNC: 96 MG/DL — SIGNIFICANT CHANGE UP (ref 70–99)
HAV IGM SER-ACNC: SIGNIFICANT CHANGE UP
HBV CORE IGM SER-ACNC: SIGNIFICANT CHANGE UP
HBV SURFACE AG SER-ACNC: SIGNIFICANT CHANGE UP
HCT VFR BLD CALC: 40 % — LOW (ref 42–52)
HCV AB S/CO SERPL IA: 0.17 S/CO — SIGNIFICANT CHANGE UP (ref 0–0.99)
HCV AB SERPL-IMP: SIGNIFICANT CHANGE UP
HGB BLD-MCNC: 13.6 G/DL — LOW (ref 14–18)
IMM GRANULOCYTES NFR BLD AUTO: 0.6 % — HIGH (ref 0.1–0.3)
LYMPHOCYTES # BLD AUTO: 0.96 K/UL — LOW (ref 1.2–3.4)
LYMPHOCYTES # BLD AUTO: 26.5 % — SIGNIFICANT CHANGE UP (ref 20.5–51.1)
MAGNESIUM SERPL-MCNC: 2.1 MG/DL — SIGNIFICANT CHANGE UP (ref 1.8–2.4)
MCHC RBC-ENTMCNC: 32.9 PG — HIGH (ref 27–31)
MCHC RBC-ENTMCNC: 34 G/DL — SIGNIFICANT CHANGE UP (ref 32–37)
MCV RBC AUTO: 96.6 FL — HIGH (ref 80–94)
MONOCYTES # BLD AUTO: 0.39 K/UL — SIGNIFICANT CHANGE UP (ref 0.1–0.6)
MONOCYTES NFR BLD AUTO: 10.8 % — HIGH (ref 1.7–9.3)
NEUTROPHILS # BLD AUTO: 1.98 K/UL — SIGNIFICANT CHANGE UP (ref 1.4–6.5)
NEUTROPHILS NFR BLD AUTO: 54.6 % — SIGNIFICANT CHANGE UP (ref 42.2–75.2)
NRBC # BLD: 0 /100 WBCS — SIGNIFICANT CHANGE UP (ref 0–0)
PHENOBARBITAL, UR RESULT: 634 NG/ML — SIGNIFICANT CHANGE UP
PLATELET # BLD AUTO: 74 K/UL — LOW (ref 130–400)
POTASSIUM SERPL-MCNC: 3.8 MMOL/L — SIGNIFICANT CHANGE UP (ref 3.5–5)
POTASSIUM SERPL-SCNC: 3.8 MMOL/L — SIGNIFICANT CHANGE UP (ref 3.5–5)
PROT SERPL-MCNC: 6.4 G/DL — SIGNIFICANT CHANGE UP (ref 6–8)
RBC # BLD: 4.14 M/UL — LOW (ref 4.7–6.1)
RBC # FLD: 12.1 % — SIGNIFICANT CHANGE UP (ref 11.5–14.5)
SECOBARBITAL UR QL SCN: NEGATIVE NG/ML — SIGNIFICANT CHANGE UP
SECOBARBITAL, UR RESULT: NEGATIVE NG/ML — SIGNIFICANT CHANGE UP
SODIUM SERPL-SCNC: 141 MMOL/L — SIGNIFICANT CHANGE UP (ref 135–146)
TSH SERPL-MCNC: 2.42 UIU/ML — SIGNIFICANT CHANGE UP (ref 0.27–4.2)
WBC # BLD: 3.62 K/UL — LOW (ref 4.8–10.8)
WBC # FLD AUTO: 3.62 K/UL — LOW (ref 4.8–10.8)

## 2020-03-09 PROCEDURE — 99239 HOSP IP/OBS DSCHRG MGMT >30: CPT

## 2020-03-09 RX ORDER — THIAMINE MONONITRATE (VIT B1) 100 MG
1 TABLET ORAL
Qty: 30 | Refills: 0
Start: 2020-03-09 | End: 2020-04-07

## 2020-03-09 RX ORDER — PANTOPRAZOLE SODIUM 20 MG/1
1 TABLET, DELAYED RELEASE ORAL
Qty: 60 | Refills: 0
Start: 2020-03-09 | End: 2020-04-07

## 2020-03-09 RX ORDER — FOLIC ACID 0.8 MG
1 TABLET ORAL
Qty: 30 | Refills: 0
Start: 2020-03-09 | End: 2020-04-07

## 2020-03-09 RX ADMIN — PANTOPRAZOLE SODIUM 40 MILLIGRAM(S): 20 TABLET, DELAYED RELEASE ORAL at 05:31

## 2020-03-09 NOTE — PROGRESS NOTE ADULT - ASSESSMENT
34 years old male pt with past medical hx of PUD came to ER because of 1 day h/o of vomiting , AMS and tremors following binge alcohol drinking.    # tremors and vomiting In setting of Alcohol abuse- resolved  - Labs with increased lactic acid with elevated ketone and beta hydroxybutyrate, elevated AGap 2/2 Alcoholic ketoacidosis.   - S/p IV fluids with improvement in labs- Lactate back to normal, Gap closed.   - Monitor electrolytes and replaced prn  - c/w  thiamine and folic acid  - Diet regular as tolerated  - Seen by toxicology        # transaminitis ( 2/2 Alcoholic liver disease)  - ALT, AST both elevated- now downtrending  - f/u hepatitis profile  - US abdomen with echogenic parenchyma 2.2 fatty vs hepatocellular disease  - Lipid profile noted    # PUD on recent endoscopy   continue PPI bid   monitor mag ( can low because of PPI induced / refeeding )    # DVT prophylaxis     enoxaparin 40 mg daily    # activity ambulate as tolerated  #diet- regular  # Ambulate as tolerated  # dispo- from home, possible DC today 34 years old male pt with past medical hx of PUD came to ER because of 1 day h/o of vomiting , AMS and tremors following binge alcohol drinking.    # tremors and vomiting In setting of Alcohol abuse- resolved  - Labs with increased lactic acid with elevated ketone and beta hydroxybutyrate, elevated AGap 2/2 Alcoholic ketoacidosis.   - S/p IV fluids with improvement in labs- Lactate back to normal, Gap closed.   - Monitor electrolytes and replaced prn  - c/w  thiamine and folic acid  - Diet regular as tolerated  - Seen by toxicology        # transaminitis ( 2/2 Alcoholic liver disease)  - ALT, AST both elevated- now downtrending  - f/u hepatitis profile  - US abdomen with echogenic parenchyma 2.2 fatty vs hepatocellular disease  - Lipid profile noted    # PUD on recent endoscopy   continue PPI bid   monitor mag ( can low because of PPI induced / refeeding )    # Thiamine and folate deficiency  - c/w Supplement     # DVT prophylaxis     enoxaparin 40 mg daily    # activity ambulate as tolerated  #diet- regular  # Ambulate as tolerated  # dispo- from home, possible DC today

## 2020-03-09 NOTE — PROGRESS NOTE ADULT - SUBJECTIVE AND OBJECTIVE BOX
SUBJECTIVE:    Patient is a 34y old Male who presents with a chief complaint of vomiting, tremor and AMS (08 Mar 2020 09:23)    Currently admitted to medicine with the primary diagnosis of Alcoholic ketoacidosis     Today is hospital day 2d. This morning he is resting comfortably in bed and reports no new issues or overnight events.   Pt seen at bedside, feels fine, is tolerating diet and able to ambulate. No Abdominal pain.     PAST MEDICAL & SURGICAL HISTORY    SOCIAL HISTORY:  Negative for smoking/alcohol/drug use.     ALLERGIES:  No Known Allergies    MEDICATIONS:  STANDING MEDICATIONS  enoxaparin Injectable 40 milliGRAM(s) SubCutaneous daily  folic acid 1 milliGRAM(s) Oral daily  multivitamin 1 Tablet(s) Oral daily  pantoprazole    Tablet 40 milliGRAM(s) Oral two times a day  thiamine 100 milliGRAM(s) Oral daily    PRN MEDICATIONS  LORazepam     Tablet 1 milliGRAM(s) Oral every 4 hours PRN  ondansetron Injectable 4 milliGRAM(s) IV Push every 8 hours PRN    VITALS:   T(F): 97.4  HR: 66  BP: 128/70  RR: 18  SpO2: 99%    LABS:                        13.6   3.62  )-----------( 74       ( 09 Mar 2020 05:56 )             40.0     03-09    141  |  104  |  8<L>  ----------------------------<  96  3.8   |  24  |  0.9    Ca    9.3      09 Mar 2020 05:56  Phos  2.7     03-08  Mg     2.1     03-09    TPro  6.4  /  Alb  4.3  /  TBili  0.6  /  DBili  x   /  AST  87<H>  /  ALT  98<H>  /  AlkPhos  49  03-09      Urinalysis Basic - ( 07 Mar 2020 16:44 )    Color: Yellow / Appearance: Clear / S.030 / pH: x  Gluc: x / Ketone: Large  / Bili: Negative / Urobili: <2 mg/dL   Blood: x / Protein: 30 mg/dL / Nitrite: Negative   Leuk Esterase: Negative / RBC: 1 /HPF / WBC 1 /HPF   Sq Epi: x / Non Sq Epi: 0 /HPF / Bacteria: Negativ    RADIOLOGY:  < from: US Abdomen Limited (20 @ 00:13) >  IMPRESSION:    Echogenic hepatic parenchyma which may be secondary to fatty infiltration or hepatocellular disease.    < end of copied text >    PHYSICAL EXAM:  GEN: No acute distress  LUNGS: Clear to auscultation bilaterally   HEART: S1/S2 present. RRR.   ABD: Soft, non-tender, non-distended. Bowel sounds present  EXT: NC/NC/NE/2+PP/[place]  NEURO: AAOX3

## 2020-03-09 NOTE — DISCHARGE NOTE NURSING/CASE MANAGEMENT/SOCIAL WORK - PATIENT PORTAL LINK FT
You can access the FollowMyHealth Patient Portal offered by [facility] by registering at the following website: [email or web address]. By joining [employer]’s FollowMyHealth portal, you will also be able to view your health information using other applications (apps) compatible with our system.

## 2020-03-09 NOTE — PROGRESS NOTE ADULT - ATTENDING COMMENTS
I saw and examined the patient at bedside.   He feels better, no tremor, no anxiety, he walks fine, no abdominal pain.   Patient is ready for discharge  Advised with follow up for alcohol detox program.   Continue Protonix, thiamin and folic acid.

## 2020-03-13 DIAGNOSIS — K27.9 PEPTIC ULCER, SITE UNSPECIFIED, UNSPECIFIED AS ACUTE OR CHRONIC, WITHOUT HEMORRHAGE OR PERFORATION: ICD-10-CM
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
## 2020-03-13 DIAGNOSIS — F10.10 ALCOHOL ABUSE, UNCOMPLICATED: ICD-10-CM

## 2020-03-13 DIAGNOSIS — E87.2 ACIDOSIS: ICD-10-CM

## 2020-03-13 DIAGNOSIS — E83.52 HYPERCALCEMIA: ICD-10-CM

## 2020-03-13 DIAGNOSIS — R74.0 NONSPECIFIC ELEVATION OF LEVELS OF TRANSAMINASE AND LACTIC ACID DEHYDROGENASE [LDH]: ICD-10-CM

## 2020-03-13 DIAGNOSIS — E83.42 HYPOMAGNESEMIA: ICD-10-CM

## 2020-03-13 DIAGNOSIS — E51.9 THIAMINE DEFICIENCY, UNSPECIFIED: ICD-10-CM

## 2020-03-13 DIAGNOSIS — E53.8 DEFICIENCY OF OTHER SPECIFIED B GROUP VITAMINS: ICD-10-CM

## 2020-03-13 DIAGNOSIS — E87.3 ALKALOSIS: ICD-10-CM

## 2020-03-13 DIAGNOSIS — E88.89 OTHER SPECIFIED METABOLIC DISORDERS: ICD-10-CM

## 2020-03-13 DIAGNOSIS — E87.6 HYPOKALEMIA: ICD-10-CM

## 2020-03-13 DIAGNOSIS — R11.2 NAUSEA WITH VOMITING, UNSPECIFIED: ICD-10-CM

## 2020-07-25 NOTE — ED ADULT TRIAGE NOTE - MODE OF ARRIVAL
[place] Zachary RN and Amando HENRY at bedside performing pelvic exam       Fermín Alejandro RN  07/25/20 [id number]

## 2021-06-15 NOTE — ED ADULT TRIAGE NOTE - INTERNATIONAL TRAVEL
Patient calling reporting having chest pain the past two days. States chest pain feels like pressure like. Per guideline, advised patient to call 911. Refused disposition.     William Holcomb RN  [facility]     COVID 19 Nurse Triage Plan/Patient Instructions    Please be aware that novel coronavirus (COVID-19) may be circulating in the community. If you develop symptoms such as fever, cough, or SOB or if you have concerns about the presence of another infection including coronavirus (COVID-19), please contact your health care provider or visit [email or web address].     Disposition/Instructions    Call to EMS/911 recommended. Follow protocol based instructions.    Bring Your Own Device:  Please also bring your smart device(s) (smart phones, tablets, laptops) and their charging cables for your personal use and to communicate with your care team during your visit.      Thank you for taking steps to prevent the spread of this virus.  o Limit your contact with others.  o Wear a simple mask to cover your cough.  o Wash your hands well and often.    Resources    [facility]: About COVID-19: [email or web address]    CDC: What to Do If You're Sick: www.cdc.gov/coronavirus/2019-ncov/about/steps-when-sick.html    CDC: Ending Home Isolation: www.cdc.gov/coronavirus/2019-ncov/hcp/disposition-in-home-patients.html     CDC: Caring for Someone: www.cdc.gov/coronavirus/2019-ncov/if-you-are-sick/care-for-someone.html     [facility]: Interim Guidance for Hospital Discharge to Home: www.health.[facility].[place]/diseases/coronavirus/hcp/hospdischarge.pdf    [facility] clinical trials (COVID-19 research studies): [email or web address]-trials     Below are the COVID-19 hotlines at the [place] Department of [facility] ([facility]). Interpreters are available.   o For health questions: Call 087-631-8819 or 1-766.346.4423 (7 a.m. to 7 p.m.)  o For questions about schools and childcare: Call 684-034-4698 or  1-011-649-7375 (7 a.m. to 7 p.m.)       Reason for Disposition    [1] Chest pain lasts > 5 minutes AND [2] described as crushing, pressure-like, or heavy    Additional Information    Negative: Severe difficulty breathing (e.g., struggling for each breath, speaks in single words)    Negative: Difficult to awaken or acting confused (e.g., disoriented, slurred speech)    Negative: Shock suspected (e.g., cold/pale/clammy skin, too weak to stand, low BP, rapid pulse)    Negative: [1] Chest pain lasts > 5 minutes AND [2] history of heart disease  (i.e., heart attack, bypass surgery, angina, angioplasty, CHF; not just a heart murmur)    Protocols used: CHEST PAIN-A-AH       No

## 2021-06-30 ENCOUNTER — EMERGENCY (EMERGENCY)
Facility: HOSPITAL | Age: 36
LOS: 0 days | Discharge: HOME | End: 2021-06-30
Attending: EMERGENCY MEDICINE | Admitting: EMERGENCY MEDICINE
Payer: OTHER GOVERNMENT

## 2021-06-30 VITALS
OXYGEN SATURATION: 98 % | HEART RATE: 87 BPM | RESPIRATION RATE: 18 BRPM | DIASTOLIC BLOOD PRESSURE: 79 MMHG | TEMPERATURE: 98 F | SYSTOLIC BLOOD PRESSURE: 149 MMHG

## 2021-06-30 VITALS
SYSTOLIC BLOOD PRESSURE: 139 MMHG | RESPIRATION RATE: 17 BRPM | OXYGEN SATURATION: 96 % | WEIGHT: 229.94 LBS | HEART RATE: 90 BPM | TEMPERATURE: 97 F | DIASTOLIC BLOOD PRESSURE: 87 MMHG

## 2021-06-30 DIAGNOSIS — R42 DIZZINESS AND GIDDINESS: ICD-10-CM

## 2021-06-30 DIAGNOSIS — F17.200 NICOTINE DEPENDENCE, UNSPECIFIED, UNCOMPLICATED: ICD-10-CM

## 2021-06-30 DIAGNOSIS — R74.01 ELEVATION OF LEVELS OF LIVER TRANSAMINASE LEVELS: ICD-10-CM

## 2021-06-30 DIAGNOSIS — F32.9 MAJOR DEPRESSIVE DISORDER, SINGLE EPISODE, UNSPECIFIED: ICD-10-CM

## 2021-06-30 DIAGNOSIS — R11.2 NAUSEA WITH VOMITING, UNSPECIFIED: ICD-10-CM

## 2021-06-30 DIAGNOSIS — Z20.822 CONTACT WITH AND (SUSPECTED) EXPOSURE TO COVID-19: ICD-10-CM

## 2021-06-30 LAB
ALBUMIN SERPL ELPH-MCNC: 4.8 G/DL — SIGNIFICANT CHANGE UP (ref 3.5–5.2)
ALP SERPL-CCNC: 65 U/L — SIGNIFICANT CHANGE UP (ref 30–115)
ALT FLD-CCNC: 170 U/L — HIGH (ref 0–41)
ANION GAP SERPL CALC-SCNC: 13 MMOL/L — SIGNIFICANT CHANGE UP (ref 7–14)
APAP SERPL-MCNC: <5 UG/ML — LOW (ref 10–30)
APPEARANCE UR: CLEAR — SIGNIFICANT CHANGE UP
AST SERPL-CCNC: 128 U/L — HIGH (ref 0–41)
B-OH-BUTYR SERPL-SCNC: <0.2 MMOL/L — SIGNIFICANT CHANGE UP
BACTERIA # UR AUTO: NEGATIVE — SIGNIFICANT CHANGE UP
BASE EXCESS BLDV CALC-SCNC: 3.8 MMOL/L — HIGH (ref -2–2)
BASE EXCESS BLDV CALC-SCNC: 5.6 MMOL/L — HIGH (ref -2–2)
BASE EXCESS BLDV CALC-SCNC: 8.9 MMOL/L — HIGH (ref -2–2)
BASOPHILS # BLD AUTO: 0.05 K/UL — SIGNIFICANT CHANGE UP (ref 0–0.2)
BASOPHILS NFR BLD AUTO: 0.5 % — SIGNIFICANT CHANGE UP (ref 0–1)
BILIRUB DIRECT SERPL-MCNC: <0.2 MG/DL — SIGNIFICANT CHANGE UP (ref 0–0.2)
BILIRUB INDIRECT FLD-MCNC: >0.2 MG/DL — SIGNIFICANT CHANGE UP (ref 0.2–1.2)
BILIRUB SERPL-MCNC: 0.4 MG/DL — SIGNIFICANT CHANGE UP (ref 0.2–1.2)
BILIRUB UR-MCNC: NEGATIVE — SIGNIFICANT CHANGE UP
BUN SERPL-MCNC: 16 MG/DL — SIGNIFICANT CHANGE UP (ref 10–20)
CA-I SERPL-SCNC: 1.07 MMOL/L — LOW (ref 1.12–1.3)
CA-I SERPL-SCNC: 1.1 MMOL/L — LOW (ref 1.12–1.3)
CA-I SERPL-SCNC: 1.12 MMOL/L — SIGNIFICANT CHANGE UP (ref 1.12–1.3)
CALCIUM SERPL-MCNC: 9.6 MG/DL — SIGNIFICANT CHANGE UP (ref 8.5–10.1)
CHLORIDE SERPL-SCNC: 98 MMOL/L — SIGNIFICANT CHANGE UP (ref 98–110)
CO2 SERPL-SCNC: 28 MMOL/L — SIGNIFICANT CHANGE UP (ref 17–32)
COLOR SPEC: YELLOW — SIGNIFICANT CHANGE UP
CREAT SERPL-MCNC: 1 MG/DL — SIGNIFICANT CHANGE UP (ref 0.7–1.5)
DIFF PNL FLD: NEGATIVE — SIGNIFICANT CHANGE UP
EOSINOPHIL # BLD AUTO: 0.02 K/UL — SIGNIFICANT CHANGE UP (ref 0–0.7)
EOSINOPHIL NFR BLD AUTO: 0.2 % — SIGNIFICANT CHANGE UP (ref 0–8)
EPI CELLS # UR: 0 /HPF — SIGNIFICANT CHANGE UP (ref 0–5)
ETHANOL SERPL-MCNC: 95 MG/DL — HIGH
GAS PNL BLDV: 139 MMOL/L — SIGNIFICANT CHANGE UP (ref 136–145)
GAS PNL BLDV: 140 MMOL/L — SIGNIFICANT CHANGE UP (ref 136–145)
GAS PNL BLDV: 140 MMOL/L — SIGNIFICANT CHANGE UP (ref 136–145)
GAS PNL BLDV: SIGNIFICANT CHANGE UP
GLUCOSE SERPL-MCNC: 119 MG/DL — HIGH (ref 70–99)
GLUCOSE UR QL: NEGATIVE — SIGNIFICANT CHANGE UP
HCO3 BLDV-SCNC: 28 MMOL/L — SIGNIFICANT CHANGE UP (ref 22–29)
HCO3 BLDV-SCNC: 29 MMOL/L — SIGNIFICANT CHANGE UP (ref 22–29)
HCO3 BLDV-SCNC: 31 MMOL/L — HIGH (ref 22–29)
HCT VFR BLD CALC: 47 % — SIGNIFICANT CHANGE UP (ref 42–52)
HCT VFR BLDA CALC: 46.2 % — HIGH (ref 34–44)
HCT VFR BLDA CALC: 50.2 % — HIGH (ref 34–44)
HCT VFR BLDA CALC: 51.9 % — HIGH (ref 34–44)
HGB BLD CALC-MCNC: 15.1 G/DL — SIGNIFICANT CHANGE UP (ref 14–18)
HGB BLD CALC-MCNC: 16.4 G/DL — SIGNIFICANT CHANGE UP (ref 14–18)
HGB BLD CALC-MCNC: 16.9 G/DL — SIGNIFICANT CHANGE UP (ref 14–18)
HGB BLD-MCNC: 17.1 G/DL — SIGNIFICANT CHANGE UP (ref 14–18)
HYALINE CASTS # UR AUTO: 0 /LPF — SIGNIFICANT CHANGE UP (ref 0–7)
IMM GRANULOCYTES NFR BLD AUTO: 0.3 % — SIGNIFICANT CHANGE UP (ref 0.1–0.3)
KETONES UR-MCNC: NEGATIVE — SIGNIFICANT CHANGE UP
LACTATE BLDV-MCNC: 3 MMOL/L — HIGH (ref 0.5–1.6)
LACTATE BLDV-MCNC: 3.1 MMOL/L — HIGH (ref 0.5–1.6)
LACTATE BLDV-MCNC: 3.2 MMOL/L — HIGH (ref 0.5–1.6)
LACTATE SERPL-SCNC: 3.4 MMOL/L — HIGH (ref 0.7–2)
LEUKOCYTE ESTERASE UR-ACNC: NEGATIVE — SIGNIFICANT CHANGE UP
LIDOCAIN IGE QN: 16 U/L — SIGNIFICANT CHANGE UP (ref 7–60)
LYMPHOCYTES # BLD AUTO: 1.92 K/UL — SIGNIFICANT CHANGE UP (ref 1.2–3.4)
LYMPHOCYTES # BLD AUTO: 20 % — LOW (ref 20.5–51.1)
MCHC RBC-ENTMCNC: 30.8 PG — SIGNIFICANT CHANGE UP (ref 27–31)
MCHC RBC-ENTMCNC: 36.4 G/DL — SIGNIFICANT CHANGE UP (ref 32–37)
MCV RBC AUTO: 84.5 FL — SIGNIFICANT CHANGE UP (ref 80–94)
MONOCYTES # BLD AUTO: 0.57 K/UL — SIGNIFICANT CHANGE UP (ref 0.1–0.6)
MONOCYTES NFR BLD AUTO: 5.9 % — SIGNIFICANT CHANGE UP (ref 1.7–9.3)
NEUTROPHILS # BLD AUTO: 6.99 K/UL — HIGH (ref 1.4–6.5)
NEUTROPHILS NFR BLD AUTO: 73.1 % — SIGNIFICANT CHANGE UP (ref 42.2–75.2)
NITRITE UR-MCNC: NEGATIVE — SIGNIFICANT CHANGE UP
NRBC # BLD: 0 /100 WBCS — SIGNIFICANT CHANGE UP (ref 0–0)
PCO2 BLDV: 34 MMHG — LOW (ref 41–51)
PCO2 BLDV: 38 MMHG — LOW (ref 41–51)
PCO2 BLDV: 38 MMHG — LOW (ref 41–51)
PH BLDV: 7.47 — HIGH (ref 7.26–7.43)
PH BLDV: 7.49 — HIGH (ref 7.26–7.43)
PH BLDV: 7.57 — HIGH (ref 7.26–7.43)
PH UR: 8.5 — HIGH (ref 5–8)
PLATELET # BLD AUTO: 298 K/UL — SIGNIFICANT CHANGE UP (ref 130–400)
PO2 BLDV: 32 MMHG — SIGNIFICANT CHANGE UP (ref 20–40)
PO2 BLDV: 47 MMHG — HIGH (ref 20–40)
PO2 BLDV: 49 MMHG — HIGH (ref 20–40)
POTASSIUM BLDV-SCNC: 3.7 MMOL/L — SIGNIFICANT CHANGE UP (ref 3.3–5.6)
POTASSIUM BLDV-SCNC: 4 MMOL/L — SIGNIFICANT CHANGE UP (ref 3.3–5.6)
POTASSIUM BLDV-SCNC: 4.4 MMOL/L — SIGNIFICANT CHANGE UP (ref 3.3–5.6)
POTASSIUM SERPL-MCNC: 4.3 MMOL/L — SIGNIFICANT CHANGE UP (ref 3.5–5)
POTASSIUM SERPL-SCNC: 4.3 MMOL/L — SIGNIFICANT CHANGE UP (ref 3.5–5)
PROT SERPL-MCNC: 7.2 G/DL — SIGNIFICANT CHANGE UP (ref 6–8)
PROT UR-MCNC: ABNORMAL
RBC # BLD: 5.56 M/UL — SIGNIFICANT CHANGE UP (ref 4.7–6.1)
RBC # FLD: 11.8 % — SIGNIFICANT CHANGE UP (ref 11.5–14.5)
RBC CASTS # UR COMP ASSIST: 1 /HPF — SIGNIFICANT CHANGE UP (ref 0–4)
SALICYLATES SERPL-MCNC: <0.3 MG/DL — LOW (ref 4–30)
SAO2 % BLDV: 62 % — SIGNIFICANT CHANGE UP
SAO2 % BLDV: 86 % — SIGNIFICANT CHANGE UP
SAO2 % BLDV: 90 % — SIGNIFICANT CHANGE UP
SARS-COV-2 RNA SPEC QL NAA+PROBE: SIGNIFICANT CHANGE UP
SODIUM SERPL-SCNC: 139 MMOL/L — SIGNIFICANT CHANGE UP (ref 135–146)
SP GR SPEC: 1.03 — SIGNIFICANT CHANGE UP (ref 1.01–1.03)
TROPONIN T SERPL-MCNC: <0.01 NG/ML — SIGNIFICANT CHANGE UP
UROBILINOGEN FLD QL: SIGNIFICANT CHANGE UP
WBC # BLD: 9.58 K/UL — SIGNIFICANT CHANGE UP (ref 4.8–10.8)
WBC # FLD AUTO: 9.58 K/UL — SIGNIFICANT CHANGE UP (ref 4.8–10.8)
WBC UR QL: 1 /HPF — SIGNIFICANT CHANGE UP (ref 0–5)

## 2021-06-30 PROCEDURE — 99285 EMERGENCY DEPT VISIT HI MDM: CPT

## 2021-06-30 PROCEDURE — 74177 CT ABD & PELVIS W/CONTRAST: CPT | Mod: 26,MA

## 2021-06-30 PROCEDURE — 93010 ELECTROCARDIOGRAM REPORT: CPT

## 2021-06-30 PROCEDURE — 71045 X-RAY EXAM CHEST 1 VIEW: CPT | Mod: 26

## 2021-06-30 RX ORDER — SODIUM CHLORIDE 9 MG/ML
1000 INJECTION INTRAMUSCULAR; INTRAVENOUS; SUBCUTANEOUS ONCE
Refills: 0 | Status: COMPLETED | OUTPATIENT
Start: 2021-06-30 | End: 2021-06-30

## 2021-06-30 RX ORDER — FAMOTIDINE 10 MG/ML
20 INJECTION INTRAVENOUS ONCE
Refills: 0 | Status: COMPLETED | OUTPATIENT
Start: 2021-06-30 | End: 2021-06-30

## 2021-06-30 RX ORDER — ONDANSETRON 8 MG/1
4 TABLET, FILM COATED ORAL ONCE
Refills: 0 | Status: COMPLETED | OUTPATIENT
Start: 2021-06-30 | End: 2021-06-30

## 2021-06-30 RX ADMIN — SODIUM CHLORIDE 1000 MILLILITER(S): 9 INJECTION INTRAMUSCULAR; INTRAVENOUS; SUBCUTANEOUS at 09:28

## 2021-06-30 RX ADMIN — ONDANSETRON 4 MILLIGRAM(S): 8 TABLET, FILM COATED ORAL at 09:28

## 2021-06-30 RX ADMIN — Medication 1 MILLIGRAM(S): at 14:28

## 2021-06-30 RX ADMIN — Medication 50 MILLIGRAM(S): at 16:42

## 2021-06-30 RX ADMIN — Medication 1 MILLIGRAM(S): at 09:29

## 2021-06-30 RX ADMIN — SODIUM CHLORIDE 1000 MILLILITER(S): 9 INJECTION INTRAMUSCULAR; INTRAVENOUS; SUBCUTANEOUS at 10:35

## 2021-06-30 RX ADMIN — SODIUM CHLORIDE 1000 MILLILITER(S): 9 INJECTION INTRAMUSCULAR; INTRAVENOUS; SUBCUTANEOUS at 12:12

## 2021-06-30 RX ADMIN — FAMOTIDINE 20 MILLIGRAM(S): 10 INJECTION INTRAVENOUS at 09:27

## 2021-06-30 NOTE — ED PROVIDER NOTE - OBJECTIVE STATEMENT
37 y/o male with hx Depression, Alcohol abuse presents to the ED c/o "I feel lightheaded, thirsty, nausea, vomiting since last night." no cp/ sob/ diarrhea/ urinary symptoms

## 2021-06-30 NOTE — ED PROVIDER NOTE - ATTENDING CONTRIBUTION TO CARE
36 yr old m w/ a pmh significant for etoh abuse who presents s/p vomiting. Pt states that since yesterday he has been having vomiting/nausea. Pt states denies any fevers, chills, chest pain, sob or any other complaints. Pt states that he has not taken his depression medication since this weekend.   Of note, pt has been having stressor at home (pt is currently through a divorce). Pt denies HI/SI. Pt denies any other medical complaints.     VITAL SIGNS: I have reviewed nursing notes and confirm.  CONSTITUTIONAL: non-toxic, well appearing  SKIN: no rash, no petechiae.  EYES: EOMI, pink conjunctiva, anicteric  ENT: tongue midline, no exudates, MMM  NECK: Supple; no meningismus, no JVD  CARD: RRR, no murmurs, equal radial pulses bilaterally 2+  RESP: CTAB, no respiratory distress  ABD: Soft, non-tender, non-distended, no peritoneal signs, no HSM, no CVA tenderness  EXT: Normal ROM x4. No edema. No calves tenderness  NEURO: Alert, oriented. CN2-12 intact, equal strength bilaterally, nl gait.  PSYCH: Cooperative, appropriate.    a/p  36 yr old m that presents with n/v  -labs  -ekg  -imaging  -IVF  -will consult addiction   -dispo pending

## 2021-06-30 NOTE — ED PROVIDER NOTE - PROGRESS NOTE DETAILS
Abhinav: pt signed out to Dr. Davis. CT a/p pending. reassesses. dispo. Pt s/o to me by Dr. La to follow up imaging, reassess and dispo. The patient wishes to leave against medical advice.  I have discussed the risks, benefits and alternatives (including the possibility of worsening of disease, pain, permanent disability, and/or death) with the patient and his/her family (if available).  The patient voices understanding of these risks, benefits, and alternatives and still wishes to sign out against medical advice.  The patient is awake, alert, oriented  x 3 and has demonstrated capacity to refuse/direct care.  I have advised the patient that they can and should return immediately should they develop any worse/different/additional symptoms, or if they change their mind and want to continue their care.

## 2021-06-30 NOTE — SBIRT NOTE ADULT - NSSBIRTBRIEFINTDET_GEN_A_CORE
Provided SBIRT services: Full screen positive. Referral to Treatment attempted. Screening results were reviewed with the patient and patient was provided information about healthy guidelines and potential negative consequences associated with level of risk. Motivation and readiness to reduce or stop use was discussed and goals and activities to make changes were suggested/offered. Pt reports that he does not recall consuming alcohol over the past 24 hours.  Options discussed for further evaluation and treatment, but referral to treatment was not completed because   patient refused. Pt reports attending AA meetings in [place] and plans to continue when he returns home tomorrow. Pt reports seeing his provider 3x/wk and plans to ask him for a referral to an outpatient treatment program. Reportedly they have discussed his alcohol use and his provider has recommended that he attend an outpt program.

## 2021-06-30 NOTE — ED ADULT TRIAGE NOTE - CHIEF COMPLAINT QUOTE
BIBA c/o vomiting and abdominal pain since last night ; as per EMS pt is on Lexapro and has not had since Saturday

## 2021-06-30 NOTE — ED PROVIDER NOTE - PATIENT PORTAL LINK FT
You can access the FollowMyHealth Patient Portal offered by [facility] by registering at the following website: [email or web address]. By joining [employer]’s FollowMyHealth portal, you will also be able to view your health information using other applications (apps) compatible with our system.

## 2021-06-30 NOTE — ED PROVIDER NOTE - CLINICAL SUMMARY MEDICAL DECISION MAKING FREE TEXT BOX
pt evaluated for abdominal pain and vomiting, pt with persistent lactate after several liters IVF, CT A/P without acute pathology. Pt admitted for further treatment but then decided he wanted to leave AMA. All risks discussed with pt including risk of death but pt chose to sign out. Advised of need for close follow up, [place] [facility] and return if sx worsened. Strict return precautions advised and pt verbalized understanding.

## 2021-06-30 NOTE — ED ADULT NURSE NOTE - OBJECTIVE STATEMENT
BIBA c/o non bloody vomiting and abdominal pain x1 day. Pt states hasn't taking Lexapro and Trazadone since sunday. On assessment pt is anxious and restless hyperventilating reports stressors at home recent separation from wife. Pt denies suicidal/homicidal ideation. Pt states "I use to drink a lot" States Last drink was 1x month ago. Pt denies fever chills. Abdomen soft -  RUQ epigastric pain with palpation-  nondistended.

## 2023-06-23 NOTE — ED ADULT NURSE NOTE - NSIMPLEMENTINTERV_GEN_ALL_ED
Implemented All Fall Risk Interventions:  [place] to call system. Call bell, personal items and telephone within reach. Instruct patient to call for assistance. Room bathroom lighting operational. Non-slip footwear when patient is off stretcher. Physically safe environment: no spills, clutter or unnecessary equipment. Stretcher in lowest position, wheels locked, appropriate side rails in place. Provide visual cue, wrist band, yellow gown, etc. Monitor gait and stability. Monitor for mental status changes and reorient to person, place, and time. Review medications for side effects contributing to fall risk. Reinforce activity limits and safety measures with patient and family. Initiate Treatment: Eucrisa 2 % topical ointment \\nQuantity: 60.0 g  Days Supply: 30\\nSig: Apply a pea sized amount topically to affected areas bid as needed for flares. Render In Strict Bullet Format?: No Detail Level: Zone Initiate Treatment: Twyneo 0.1 %-3 % topical cream QHS\\nQuantity: 30.0 g  Days Supply: 30\\nSig: Apply a thin layer topically to affected areas on face QHS. Start 3 times a week then increase to nightly as tolerated

## 2024-05-15 NOTE — ED ADULT NURSE NOTE - NS ED NURSE RECORD ANOTHER VITAL SIGN
Griffin Yes